# Patient Record
Sex: FEMALE | Race: WHITE | NOT HISPANIC OR LATINO | Employment: STUDENT | URBAN - METROPOLITAN AREA
[De-identification: names, ages, dates, MRNs, and addresses within clinical notes are randomized per-mention and may not be internally consistent; named-entity substitution may affect disease eponyms.]

---
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
## 2023-06-28 ENCOUNTER — HOSPITAL ENCOUNTER (OUTPATIENT)
Dept: PREADMISSION TESTING | Facility: HOSPITAL | Age: 23
Discharge: HOME | End: 2023-06-28
Attending: SURGERY
Payer: MEDICAID

## 2023-06-28 NOTE — DISCHARGE INSTRUCTIONS
[facility]  Pre Admit Instructions  Day and Date of Procedure:      Call your doctor if you become ill, have an infection, or are taking antibiotics before your surgery  Someone will call you between 10 a.m. and 5 p.m. the workday before the procedure to give you an arrival time       - If your time is before 7 a.m. Enter through Emergency Room door and check in with them       - 7 a.m. To 5 p.m. Enter through main entrance and check in with registration  You must have a responsible  to bring you home  Only one person will be allowed per patient due to Covid-19 restrictions  You must wear a mask at all times. If you do not have a mask, we will provide you with one. No Exception.   Cafeteria hours for guest: 7am to 10am; 11am to 1:30 pm.    Do NOT eat anything past:   Clear liquids until:  No dairy, creamers, gum or mints the morning of your procedure    Please    Do not wear makeup, jewelry, nail polish or body piercings  Bring containers/solution for contacts, dentures, bridges - these and hearing aids will be removed before your procedure  Do not bring cash, jewelry or valuables the day of your procedure   No smoking at least 24 hours before your procedure  Wear clothing that is comfortable and easy to take off and put on  Do NOT shave for at least 5 days before your surgery    PRE-OP Hibiclens bath Instructions:    Shower with Hibiclens the Night before and morning of the procedure.   Wash your face with your regular cleanser. DO NOT use Hibiclens on your face.  Thoroughly rinse your body with warm water from the neck down  Apply Hibiclens directly to your skin or on a wet washcloth and wash gently. Do not stand under the water while washing with Hibiclens as you will   remove the wash too soon.  Rinse thoroughly with warm water  DO NOT use your regular soap after you have bathed with the Hibiclens  Do not apply lotion or deodorant   Put on a clean pair of clothes after each  bath          Information about your stay (Please Review)    Before Surgery  Your doctor may order and review labs, x-rays, ECG or other tests as a pre-surgery workup and will call you if there is need for follow up.  No smoking inside or outside the hospital. You must leave the campus to smoke.  Wear clothing that is easy to take off and put on.  The hospital will provide you with a gown.  If your doctor orders a Fleets Enema or other prep, follow package and/or doctors orders.  Brush your teeth and rinse your mouth the morning of surgery, but dont swallow the water.  The nurse will ask questions and check your condition.  The doctor may tamela your surgical site.  Compression boots will be placed on your calves to reduce the risk of blood clots.  An IV will be started in pre-procedure area.  The doctor may order medicine to help you relax before surgery.    After Surgery  Pain medication may be ordered by the doctor after surgery.    When the nurse or doctor tells you it is okay to get out of bed, ask for help until you are stable.  The nurse may ask you to turn, cough, and deep breathe to prevent lung problems.  You can use a pillow to hold your incision when you deep breathe or cough to reduce pain.  Your blood pressure and oxygen will be monitored while in recovery  You cannot have anything to eat or drink while in surgical department  The nurse will give you discharge instructions: incision care, symptoms to report to your doctor, and your follow-up appointment when you are discharged.    You cannot drive yourself home.        Things you can do to  Reduce the Risk of Infections or Complications  Wash Hands and use Waterless Hand Sanitizers  Wash hands frequently with soap and warm water for at least 15 seconds.   Use hand sanitizers (alcohol based) often at home and in public if hands are not visibly soiled  Take Antibiotic Exactly as Prescribed  Do not stop antibiotics too soon; you risk developing infection  resistant to antibiotics  Take your antibiotic even if you are feeling better and even if they upset your stomach  Call the doctor if you cant tolerate the antibiotic or you have an allergic reaction  Stay Healthy  Take medicines as prescribed by your doctor  Keep your diabetes under control - diet and medication  Get enough rest, exercise and eat a healthy diet  Keep the Wound Clean and Dry  Wash hands before and after taking care of the incision (cut)  Wash hands when you remove a dressing, before you touch/apply a new dressing  Shower and clean incision with antibacterial soap and rinse well if the doctor approves  Allow the cut to dry completely before putting on a clean dressing  Do not touch the part of the bandage that will cover the incision  Do not use ointments unless your doctor tells you to-can promote bacterial growth  If ordered, put ointment directly on the dressing-do not touch the end of the tube  Do not scrub, remove scabs, or leave a damp dressing on the incision  Do not use peroxide or alcohol to clean the incision unless the doctor tells you to   Do not let children, pets or anyone else contaminate the incision  Stop Smoking To Prevent Infection  Stop smoking-Centers for Disease Control recommends 30 days before surgery  Smokers get more infections after surgery-studies have shown 6 times the risk  Smokers have more scarring and heal slower-open wounds get infected easier  Prevent Respiratory complications  Stop smoking  Turn, cough, and deep breathe even if you have some pain when you do so.  Splint your incision with a pillow when you cough/deep breath, to help control pain.  Do not lie in one position for long periods of time.   Prevent Blood Clots  When you wake move your legs, flex your feet, rotate your ankles, wiggle your toes  Get up when the doctor says its ok.  Dangle your feet from the side of the bed  Report symptoms-leg pain, redness/swelling, warm to touch; fever; shortness of  breath, chest pain, severe upper back pain. [facility]  Pre Admit Instructions  Day and Date of Procedure:      Call your doctor if you become ill, have an infection, or are taking antibiotics before your surgery  Someone will call you between 10 a.m. and 5 p.m. the workday before the procedure to give you an arrival time       - If your time is before 7 a.m. Enter through Emergency Room door and check in with them       - 7 a.m. To 5 p.m. Enter through main entrance and check in with registration  You must have a responsible  to bring you home  Only one person will be allowed per patient due to Covid-19 restrictions  You must wear a mask at all times. If you do not have a mask, we will provide you with one. No Exception.   Cafeteria hours for guest: 7am to 10am; 11am to 1:30 pm.    Do NOT eat anything past:   Clear liquids until:  No dairy, creamers, gum or mints the morning of your procedure    Please    Do not wear makeup, jewelry, nail polish or body piercings  Bring containers/solution for contacts, dentures, bridges - these and hearing aids will be removed before your procedure  Do not bring cash, jewelry or valuables the day of your procedure   No smoking at least 24 hours before your procedure  Wear clothing that is comfortable and easy to take off and put on  Do NOT shave for at least 5 days before your surgery    PRE-OP Hibiclens bath Instructions:    Shower with Hibiclens the Night before and morning of the procedure.   Wash your face with your regular cleanser. DO NOT use Hibiclens on your face.  Thoroughly rinse your body with warm water from the neck down  Apply Hibiclens directly to your skin or on a wet washcloth and wash gently. Do not stand under the water while washing with Hibiclens as you will   remove the wash too soon.  Rinse thoroughly with warm water  DO NOT use your regular soap after you have bathed with the Hibiclens  Do not apply lotion or deodorant   Put on a clean  pair of clothes after each bath          Information about your stay (Please Review)    Before Surgery  Your doctor may order and review labs, x-rays, ECG or other tests as a pre-surgery workup and will call you if there is need for follow up.  No smoking inside or outside the hospital. You must leave the campus to smoke.  Wear clothing that is easy to take off and put on.  The hospital will provide you with a gown.  If your doctor orders a Fleets Enema or other prep, follow package and/or doctors orders.  Brush your teeth and rinse your mouth the morning of surgery, but dont swallow the water.  The nurse will ask questions and check your condition.  The doctor may tamela your surgical site.  Compression boots will be placed on your calves to reduce the risk of blood clots.  An IV will be started in pre-procedure area.  The doctor may order medicine to help you relax before surgery.    After Surgery  Pain medication may be ordered by the doctor after surgery.    When the nurse or doctor tells you it is okay to get out of bed, ask for help until you are stable.  The nurse may ask you to turn, cough, and deep breathe to prevent lung problems.  You can use a pillow to hold your incision when you deep breathe or cough to reduce pain.  Your blood pressure and oxygen will be monitored while in recovery  You cannot have anything to eat or drink while in surgical department  The nurse will give you discharge instructions: incision care, symptoms to report to your doctor, and your follow-up appointment when you are discharged.    You cannot drive yourself home.        Things you can do to  Reduce the Risk of Infections or Complications  Wash Hands and use Waterless Hand Sanitizers  Wash hands frequently with soap and warm water for at least 15 seconds.   Use hand sanitizers (alcohol based) often at home and in public if hands are not visibly soiled  Take Antibiotic Exactly as Prescribed  Do not stop antibiotics too soon; you  risk developing infection resistant to antibiotics  Take your antibiotic even if you are feeling better and even if they upset your stomach  Call the doctor if you cant tolerate the antibiotic or you have an allergic reaction  Stay Healthy  Take medicines as prescribed by your doctor  Keep your diabetes under control - diet and medication  Get enough rest, exercise and eat a healthy diet  Keep the Wound Clean and Dry  Wash hands before and after taking care of the incision (cut)  Wash hands when you remove a dressing, before you touch/apply a new dressing  Shower and clean incision with antibacterial soap and rinse well if the doctor approves  Allow the cut to dry completely before putting on a clean dressing  Do not touch the part of the bandage that will cover the incision  Do not use ointments unless your doctor tells you to-can promote bacterial growth  If ordered, put ointment directly on the dressing-do not touch the end of the tube  Do not scrub, remove scabs, or leave a damp dressing on the incision  Do not use peroxide or alcohol to clean the incision unless the doctor tells you to   Do not let children, pets or anyone else contaminate the incision  Stop Smoking To Prevent Infection  Stop smoking-Centers for Disease Control recommends 30 days before surgery  Smokers get more infections after surgery-studies have shown 6 times the risk  Smokers have more scarring and heal slower-open wounds get infected easier  Prevent Respiratory complications  Stop smoking  Turn, cough, and deep breathe even if you have some pain when you do so.  Splint your incision with a pillow when you cough/deep breath, to help control pain.  Do not lie in one position for long periods of time.   Prevent Blood Clots  When you wake move your legs, flex your feet, rotate your ankles, wiggle your toes  Get up when the doctor says its ok.  Dangle your feet from the side of the bed  Report symptoms-leg pain, redness/swelling, warm to  touch; fever; shortness of breath, chest pain, severe upper back pain. [facility]  Pre Admit Instructions  Day and Date of Procedure:      Call your doctor if you become ill, have an infection, or are taking antibiotics before your surgery  Someone will call you between 10 a.m. and 5 p.m. the workday before the procedure to give you an arrival time       - If your time is before 7 a.m. Enter through Emergency Room door and check in with them       - 7 a.m. To 5 p.m. Enter through main entrance and check in with registration  You must have a responsible  to bring you home  Only one person will be allowed per patient due to Covid-19 restrictions  You must wear a mask at all times. If you do not have a mask, we will provide you with one. No Exception.   Cafeteria hours for guest: 7am to 10am; 11am to 1:30 pm.    Do NOT eat anything past:   Clear liquids until:  No dairy, creamers, gum or mints the morning of your procedure    Please    Do not wear makeup, jewelry, nail polish or body piercings  Bring containers/solution for contacts, dentures, bridges - these and hearing aids will be removed before your procedure  Do not bring cash, jewelry or valuables the day of your procedure   No smoking at least 24 hours before your procedure  Wear clothing that is comfortable and easy to take off and put on  Do NOT shave for at least 5 days before your surgery    PRE-OP Hibiclens bath Instructions:    Shower with Hibiclens the Night before and morning of the procedure.   Wash your face with your regular cleanser. DO NOT use Hibiclens on your face.  Thoroughly rinse your body with warm water from the neck down  Apply Hibiclens directly to your skin or on a wet washcloth and wash gently. Do not stand under the water while washing with Hibiclens as you will   remove the wash too soon.  Rinse thoroughly with warm water  DO NOT use your regular soap after you have bathed with the Hibiclens  Do not apply lotion or  deodorant   Put on a clean pair of clothes after each bath          Information about your stay (Please Review)    Before Surgery  Your doctor may order and review labs, x-rays, ECG or other tests as a pre-surgery workup and will call you if there is need for follow up.  No smoking inside or outside the hospital. You must leave the campus to smoke.  Wear clothing that is easy to take off and put on.  The hospital will provide you with a gown.  If your doctor orders a Fleets Enema or other prep, follow package and/or doctors orders.  Brush your teeth and rinse your mouth the morning of surgery, but dont swallow the water.  The nurse will ask questions and check your condition.  The doctor may tamela your surgical site.  Compression boots will be placed on your calves to reduce the risk of blood clots.  An IV will be started in pre-procedure area.  The doctor may order medicine to help you relax before surgery.    After Surgery  Pain medication may be ordered by the doctor after surgery.    When the nurse or doctor tells you it is okay to get out of bed, ask for help until you are stable.  The nurse may ask you to turn, cough, and deep breathe to prevent lung problems.  You can use a pillow to hold your incision when you deep breathe or cough to reduce pain.  Your blood pressure and oxygen will be monitored while in recovery  You cannot have anything to eat or drink while in surgical department  The nurse will give you discharge instructions: incision care, symptoms to report to your doctor, and your follow-up appointment when you are discharged.    You cannot drive yourself home.        Things you can do to  Reduce the Risk of Infections or Complications  Wash Hands and use Waterless Hand Sanitizers  Wash hands frequently with soap and warm water for at least 15 seconds.   Use hand sanitizers (alcohol based) often at home and in public if hands are not visibly soiled  Take Antibiotic Exactly as Prescribed  Do not  stop antibiotics too soon; you risk developing infection resistant to antibiotics  Take your antibiotic even if you are feeling better and even if they upset your stomach  Call the doctor if you cant tolerate the antibiotic or you have an allergic reaction  Stay Healthy  Take medicines as prescribed by your doctor  Keep your diabetes under control - diet and medication  Get enough rest, exercise and eat a healthy diet  Keep the Wound Clean and Dry  Wash hands before and after taking care of the incision (cut)  Wash hands when you remove a dressing, before you touch/apply a new dressing  Shower and clean incision with antibacterial soap and rinse well if the doctor approves  Allow the cut to dry completely before putting on a clean dressing  Do not touch the part of the bandage that will cover the incision  Do not use ointments unless your doctor tells you to-can promote bacterial growth  If ordered, put ointment directly on the dressing-do not touch the end of the tube  Do not scrub, remove scabs, or leave a damp dressing on the incision  Do not use peroxide or alcohol to clean the incision unless the doctor tells you to   Do not let children, pets or anyone else contaminate the incision  Stop Smoking To Prevent Infection  Stop smoking-Centers for Disease Control recommends 30 days before surgery  Smokers get more infections after surgery-studies have shown 6 times the risk  Smokers have more scarring and heal slower-open wounds get infected easier  Prevent Respiratory complications  Stop smoking  Turn, cough, and deep breathe even if you have some pain when you do so.  Splint your incision with a pillow when you cough/deep breath, to help control pain.  Do not lie in one position for long periods of time.   Prevent Blood Clots  When you wake move your legs, flex your feet, rotate your ankles, wiggle your toes  Get up when the doctor says its ok.  Dangle your feet from the side of the bed  Report symptoms-leg  pain, redness/swelling, warm to touch; fever; shortness of breath, chest pain, severe upper back pain.

## 2023-07-04 ENCOUNTER — ANESTHESIA EVENT (OUTPATIENT)
Dept: SURGERY | Facility: HOSPITAL | Age: 23
End: 2023-07-04
Payer: MEDICAID

## 2023-07-04 NOTE — ANESTHESIA PREPROCEDURE EVALUATION
07/04/2023  Teddy Thompson is a 22 y.o., female.      Pre-op Assessment    I have reviewed the Patient Summary Reports.     I have reviewed the Nursing Notes. I have reviewed the NPO Status.   I have reviewed the Medications.     Review of Systems  Anesthesia Hx:  No problems with previous Anesthesia    Social:  Non-Smoker, No Alcohol Use    Hematology/Oncology:  Hematology Normal   Oncology Normal     EENT/Dental:EENT/Dental Normal   Cardiovascular:  Cardiovascular Normal Exercise tolerance: good     Pulmonary:  Pulmonary Normal    Renal/:  Renal/ Normal     Hepatic/GI:  Hepatic/GI Normal    Musculoskeletal:  Musculoskeletal Normal    Neurological:   Headaches    Endocrine:  Endocrine Normal    Dermatological:  Skin Normal    Psych:  Psychiatric Normal           Physical Exam  General: Well nourished    Airway:  Mallampati: II   Mouth Opening: Normal  TM Distance: Normal  Tongue: Normal  Neck ROM: Normal ROM    Chest/Lungs:  Clear to auscultation    Heart:  Rate: Normal  Rhythm: Regular Rhythm  Sounds: Normal        Anesthesia Plan  Type of Anesthesia, risks & benefits discussed:    Anesthesia Type: Gen Supraglottic Airway, Gen ETT, MAC  Intra-op Monitoring Plan: Standard ASA Monitors  Post Op Pain Control Plan: multimodal analgesia  Induction:  IV  Airway Plan: Direct  Informed Consent: Informed consent signed with the Patient and all parties understand the risks and agree with anesthesia plan.  All questions answered.   ASA Score: 1    Ready For Surgery From Anesthesia Perspective.     .

## 2023-07-05 ENCOUNTER — HOSPITAL ENCOUNTER (OUTPATIENT)
Facility: HOSPITAL | Age: 23
Discharge: HOME | End: 2023-07-05
Attending: SURGERY | Admitting: SURGERY
Payer: MEDICAID

## 2023-07-05 ENCOUNTER — ANESTHESIA (OUTPATIENT)
Dept: SURGERY | Facility: HOSPITAL | Age: 23
End: 2023-07-05
Payer: MEDICAID

## 2023-07-05 VITALS
DIASTOLIC BLOOD PRESSURE: 64 MMHG | RESPIRATION RATE: 16 BRPM | HEART RATE: 66 BPM | TEMPERATURE: 98 F | SYSTOLIC BLOOD PRESSURE: 110 MMHG | OXYGEN SATURATION: 100 %

## 2023-07-05 DIAGNOSIS — R59.0 LYMPHADENOPATHY, CERVICAL: Primary | ICD-10-CM

## 2023-07-05 PROCEDURE — 88342 CHG IMMUNOCYTOCHEMISTRY: ICD-10-PCS | Mod: 26,59,, | Performed by: PATHOLOGY

## 2023-07-05 PROCEDURE — D9220AH HC ANESTHESIA PROFESSIONAL FEE: Mod: QZ | Performed by: NURSE ANESTHETIST, CERTIFIED REGISTERED

## 2023-07-05 PROCEDURE — 36000706: Performed by: SURGERY

## 2023-07-05 PROCEDURE — 88342 IMHCHEM/IMCYTCHM 1ST ANTB: CPT | Mod: 59 | Performed by: PATHOLOGY

## 2023-07-05 PROCEDURE — 88305 TISSUE EXAM BY PATHOLOGIST: ICD-10-PCS | Mod: 26,,, | Performed by: PATHOLOGY

## 2023-07-05 PROCEDURE — 88189 FLOWCYTOMETRY/READ 16 & >: CPT | Mod: ,,, | Performed by: PATHOLOGY

## 2023-07-05 PROCEDURE — 63600175 PHARM REV CODE 636 W HCPCS: Performed by: NURSE ANESTHETIST, CERTIFIED REGISTERED

## 2023-07-05 PROCEDURE — 36000707: Performed by: SURGERY

## 2023-07-05 PROCEDURE — 37000009 HC ANESTHESIA EA ADD 15 MINS: Performed by: SURGERY

## 2023-07-05 PROCEDURE — 25000003 PHARM REV CODE 250: Performed by: SURGERY

## 2023-07-05 PROCEDURE — 88342 IMHCHEM/IMCYTCHM 1ST ANTB: CPT | Mod: 26,59,, | Performed by: PATHOLOGY

## 2023-07-05 PROCEDURE — 63600175 PHARM REV CODE 636 W HCPCS: Performed by: SURGERY

## 2023-07-05 PROCEDURE — 00320 ANES ALL PX NECK NOS 1YR/>: CPT | Performed by: SURGERY

## 2023-07-05 PROCEDURE — 88189 PR  FLOWCYTOMETRY/READ, 16 & > MARKERS: ICD-10-PCS | Mod: ,,, | Performed by: PATHOLOGY

## 2023-07-05 PROCEDURE — 71000033 HC RECOVERY, INTIAL HOUR: Performed by: SURGERY

## 2023-07-05 PROCEDURE — 88341 IMHCHEM/IMCYTCHM EA ADD ANTB: CPT | Performed by: PATHOLOGY

## 2023-07-05 PROCEDURE — 88341 IMHCHEM/IMCYTCHM EA ADD ANTB: CPT | Mod: 26,59,, | Performed by: PATHOLOGY

## 2023-07-05 PROCEDURE — 37000008 HC ANESTHESIA 1ST 15 MINUTES: Performed by: SURGERY

## 2023-07-05 PROCEDURE — 88365 INSITU HYBRIDIZATION (FISH): CPT | Performed by: PATHOLOGY

## 2023-07-05 PROCEDURE — 88185 FLOWCYTOMETRY/TC ADD-ON: CPT | Performed by: PATHOLOGY

## 2023-07-05 PROCEDURE — 88305 TISSUE EXAM BY PATHOLOGIST: CPT | Mod: 26,,, | Performed by: PATHOLOGY

## 2023-07-05 PROCEDURE — 88184 FLOWCYTOMETRY/ TC 1 MARKER: CPT | Performed by: PATHOLOGY

## 2023-07-05 PROCEDURE — 88365 INSITU HYBRIDIZATION (FISH): CPT | Mod: 26,,, | Performed by: PATHOLOGY

## 2023-07-05 PROCEDURE — 88365 PR  TISSUE HYBRIDIZATION: ICD-10-PCS | Mod: 26,,, | Performed by: PATHOLOGY

## 2023-07-05 PROCEDURE — 25000003 PHARM REV CODE 250: Performed by: NURSE ANESTHETIST, CERTIFIED REGISTERED

## 2023-07-05 PROCEDURE — 00320 ANES ALL PX NECK NOS 1YR/>: CPT | Mod: QZ | Performed by: NURSE ANESTHETIST, CERTIFIED REGISTERED

## 2023-07-05 PROCEDURE — 88341 PR IHC OR ICC EACH ADD'L SINGLE ANTIBODY  STAINPR: ICD-10-PCS | Mod: 26,59,, | Performed by: PATHOLOGY

## 2023-07-05 RX ORDER — PROPOFOL 10 MG/ML
VIAL (ML) INTRAVENOUS
Status: DISCONTINUED | OUTPATIENT
Start: 2023-07-05 | End: 2023-07-05

## 2023-07-05 RX ORDER — SODIUM CHLORIDE 9 MG/ML
INJECTION, SOLUTION INTRAVENOUS CONTINUOUS
Status: CANCELLED | OUTPATIENT
Start: 2023-07-05

## 2023-07-05 RX ORDER — HYDROCODONE BITARTRATE AND ACETAMINOPHEN 10; 325 MG/1; MG/1
1 TABLET ORAL EVERY 6 HOURS PRN
Qty: 10 TABLET | Refills: 0 | Status: SHIPPED | OUTPATIENT
Start: 2023-07-05

## 2023-07-05 RX ORDER — BUPIVACAINE HYDROCHLORIDE AND EPINEPHRINE 5; 5 MG/ML; UG/ML
INJECTION, SOLUTION EPIDURAL; INTRACAUDAL; PERINEURAL
Status: DISCONTINUED | OUTPATIENT
Start: 2023-07-05 | End: 2023-07-05 | Stop reason: HOSPADM

## 2023-07-05 RX ORDER — ACETAMINOPHEN 10 MG/ML
INJECTION, SOLUTION INTRAVENOUS
Status: DISCONTINUED | OUTPATIENT
Start: 2023-07-05 | End: 2023-07-05

## 2023-07-05 RX ORDER — FENTANYL CITRATE 50 UG/ML
INJECTION, SOLUTION INTRAMUSCULAR; INTRAVENOUS
Status: DISCONTINUED | OUTPATIENT
Start: 2023-07-05 | End: 2023-07-05

## 2023-07-05 RX ORDER — MIDAZOLAM HYDROCHLORIDE 1 MG/ML
INJECTION, SOLUTION INTRAMUSCULAR; INTRAVENOUS
Status: DISCONTINUED | OUTPATIENT
Start: 2023-07-05 | End: 2023-07-05

## 2023-07-05 RX ORDER — LIDOCAINE HYDROCHLORIDE 20 MG/ML
INJECTION, SOLUTION EPIDURAL; INFILTRATION; INTRACAUDAL; PERINEURAL
Status: DISCONTINUED | OUTPATIENT
Start: 2023-07-05 | End: 2023-07-05

## 2023-07-05 RX ORDER — IBUPROFEN 600 MG/1
600 TABLET ORAL EVERY 6 HOURS PRN
Status: CANCELLED | OUTPATIENT
Start: 2023-07-05

## 2023-07-05 RX ORDER — KETOROLAC TROMETHAMINE 30 MG/ML
INJECTION, SOLUTION INTRAMUSCULAR; INTRAVENOUS
Status: DISCONTINUED | OUTPATIENT
Start: 2023-07-05 | End: 2023-07-05

## 2023-07-05 RX ORDER — KETAMINE HCL IN 0.9 % NACL 50 MG/5 ML
SYRINGE (ML) INTRAVENOUS
Status: DISCONTINUED | OUTPATIENT
Start: 2023-07-05 | End: 2023-07-05

## 2023-07-05 RX ORDER — HYDROCODONE BITARTRATE AND ACETAMINOPHEN 5; 325 MG/1; MG/1
1 TABLET ORAL EVERY 4 HOURS PRN
Status: CANCELLED | OUTPATIENT
Start: 2023-07-05

## 2023-07-05 RX ORDER — ONDANSETRON 2 MG/ML
4 INJECTION INTRAMUSCULAR; INTRAVENOUS EVERY 12 HOURS PRN
Status: CANCELLED | OUTPATIENT
Start: 2023-07-05

## 2023-07-05 RX ORDER — ONDANSETRON 2 MG/ML
INJECTION INTRAMUSCULAR; INTRAVENOUS
Status: DISCONTINUED | OUTPATIENT
Start: 2023-07-05 | End: 2023-07-05

## 2023-07-05 RX ADMIN — FENTANYL CITRATE 50 MCG: 50 INJECTION, SOLUTION INTRAMUSCULAR; INTRAVENOUS at 10:07

## 2023-07-05 RX ADMIN — PROPOFOL 100 MG: 10 INJECTION, EMULSION INTRAVENOUS at 10:07

## 2023-07-05 RX ADMIN — PROPOFOL 50 MG: 10 INJECTION, EMULSION INTRAVENOUS at 11:07

## 2023-07-05 RX ADMIN — CEFAZOLIN 2 G: 2 INJECTION, POWDER, FOR SOLUTION INTRAMUSCULAR; INTRAVENOUS at 11:07

## 2023-07-05 RX ADMIN — ACETAMINOPHEN 1000 MG: 10 INJECTION, SOLUTION INTRAVENOUS at 11:07

## 2023-07-05 RX ADMIN — Medication 30 MG: at 11:07

## 2023-07-05 RX ADMIN — MIDAZOLAM 1 MG: 1 INJECTION INTRAMUSCULAR; INTRAVENOUS at 11:07

## 2023-07-05 RX ADMIN — FENTANYL CITRATE 50 MCG: 50 INJECTION, SOLUTION INTRAMUSCULAR; INTRAVENOUS at 11:07

## 2023-07-05 RX ADMIN — SODIUM CHLORIDE, SODIUM LACTATE, POTASSIUM CHLORIDE, AND CALCIUM CHLORIDE: .6; .31; .03; .02 INJECTION, SOLUTION INTRAVENOUS at 10:07

## 2023-07-05 RX ADMIN — ONDANSETRON 8 MG: 2 INJECTION, SOLUTION INTRAMUSCULAR; INTRAVENOUS at 11:07

## 2023-07-05 RX ADMIN — MIDAZOLAM 3 MG: 1 INJECTION INTRAMUSCULAR; INTRAVENOUS at 10:07

## 2023-07-05 RX ADMIN — Medication 20 MG: at 11:07

## 2023-07-05 RX ADMIN — KETOROLAC TROMETHAMINE 30 MG: 30 INJECTION, SOLUTION INTRAMUSCULAR at 11:07

## 2023-07-05 RX ADMIN — LIDOCAINE HYDROCHLORIDE 80 MG: 20 INJECTION, SOLUTION EPIDURAL; INFILTRATION; INTRACAUDAL; PERINEURAL at 10:07

## 2023-07-05 NOTE — TRANSFER OF CARE
Anesthesia Transfer of Care Note    Patient: Teddy Thompson    Procedure(s) Performed: Procedure(s) (LRB):  BIOPSY, LYMPH NODE, CERVICAL (Left)    Patient location: PACU    Anesthesia Type: general    Transport from OR: Transported from OR on 6-10 L/min O2 by face mask with adequate spontaneous ventilation    Post pain: adequate analgesia    Post assessment: no apparent anesthetic complications and tolerated procedure well    Post vital signs: stable    Level of consciousness: sedated    Nausea/Vomiting: no nausea/vomiting    Complications: none    Transfer of care protocol was followed      Last vitals:   Visit Vitals  BP 99/69   Pulse 70   Temp 36.3 °C (97.3 °F) (Temporal)   Resp 16   SpO2 99%   Breastfeeding No

## 2023-07-05 NOTE — BRIEF OP NOTE
[facility] - Surgery  Brief Operative Note    Surgery Date: 7/5/2023     Surgeon(s) and Role:     * Noe Robles MD - Primary    Assisting Surgeon: None    Pre-op Diagnosis:  Lymphadenopathy [R59.1]    Post-op Diagnosis:  Post-Op Diagnosis Codes:     * Lymphadenopathy [R59.1]    Procedure(s) (LRB):  BIOPSY, LYMPH NODE, CERVICAL (Left)    Anesthesia: Local MAC    Operative Findings: enlarged deep left cervical node    Estimated Blood Loss: * No values recorded between 7/5/2023 11:09 AM and 7/5/2023 11:49 AM *         Specimens:   Specimen (24h ago, onward)       Start     Ordered    07/05/23 1137  Specimen to Pathology, Surgery General Surgery  Once        Comments: Pre-op Diagnosis: Lymphadenopathy [R59.1]Procedure(s):BIOPSY, LYMPH NODE, CERVICAL Number of specimens: 2Name of specimens: deep cervical node biopsyDrCaroline Robles     References:    Click here for ordering Quick Tip   Question Answer Comment   Procedure Type: General Surgery    Which provider would you like to cc? NOE ROBLES    Release to patient Immediate        07/05/23 1139                      Discharge Note    OUTCOME: Patient tolerated treatment/procedure well without complication and is now ready for discharge.    DISPOSITION: Home or Self Care    FINAL DIAGNOSIS:  <principal problem not specified>    FOLLOWUP: In clinic    DISCHARGE INSTRUCTIONS:    Discharge Procedure Orders   Diet general     Ice to affected area     Call MD for:  temperature >100.4     Call MD for:  persistent nausea and vomiting     Call MD for:  severe uncontrolled pain     Call MD for:  difficulty breathing, headache or visual disturbances     No dressing needed

## 2023-07-05 NOTE — OP NOTE
DATE OF PROCEDURE: 7/5/2023     PREOPERATIVE DIAGNOSES:   Lymphadenopathy [R59.1]    POSTOPERATIVE DIAGNOSES:   Lymphadenopathy [R59.1]    PROCEDURES PERFORMED:   Procedure(s) (LRB):  BIOPSY, LYMPH NODE, CERVICAL (Left)    Surgeon(s) and Role:     * Noe Ames MD - Primary     ANESTHESIA: General.     BLOOD LOSS: Minimal.     SPECIMENS: left cervical node     FINDINGS:  After consent was obtained she was taken to the operating room and placed on some IV sedation.  Her left neck was prepped and draped in the usual fashion.  Ultrasound was used to identify the adenopathy which was in the deep cervical chain.  I anesthetized a 3 cm area and made a longitudinal cut along the inferior border of the sternocleidomastoid muscle.  I dissected down into the deep space of the neck or I opened the carotid sheath and was able to visualize the left internal jugular vein.  I dissected lateral to this identifying the node.  I then bluntly teased the note out and removed it Holter.  Irrigated the site nose no evidence of any bleeding.  I then closed the platysma and anterior sternocleidomastoid fascia with 2-0 Vicryl sutures.  Skin was closed with 4-0 Monocryl as running subcuticular fashion.  Dermabond as a dressing.  She tolerated procedure well      At the end of the case sponge, instrument, and needle counts were correct.

## 2023-07-05 NOTE — ANESTHESIA POSTPROCEDURE EVALUATION
Anesthesia Post Evaluation    Patient: Teddy Thompson    Procedure(s) Performed: Procedure(s) (LRB):  BIOPSY, LYMPH NODE, CERVICAL (Left)    Final Anesthesia Type: general      Patient location during evaluation: PACU  Patient participation: Yes- Able to Participate  Level of consciousness: awake and alert and oriented  Post-procedure vital signs: reviewed and stable  Pain management: adequate  Airway patency: patent    PONV status at discharge: No PONV  Anesthetic complications: no      Cardiovascular status: blood pressure returned to baseline and hemodynamically stable  Respiratory status: unassisted, spontaneous ventilation and room air  Hydration status: euvolemic  Follow-up not needed.          Vitals Value Taken Time   /64 07/05/23 1210   Temp 36.7 °C (98 °F) 07/05/23 1153   Pulse 66 07/05/23 1210   Resp 16 07/05/23 1210   SpO2 100 % 07/05/23 1210         Event Time   Out of Recovery 12:30:00         Pain/Santos Score: Santos Score: 10 (7/5/2023 11:54 AM)

## 2023-07-06 LAB
FLOW CYTOMETRY ANTIBODIES ANALYZED - LYMPH NODE: NORMAL
FLOW CYTOMETRY COMMENT - LYMPH NODE: NORMAL
FLOW CYTOMETRY INTERPRETATION - LYMPH NODE: NORMAL

## 2023-07-11 LAB
COMMENT: NORMAL
FINAL PATHOLOGIC DIAGNOSIS: NORMAL
GROSS: NORMAL
Lab: NORMAL
MICROSCOPIC EXAM: NORMAL

## 2024-03-12 ENCOUNTER — PATIENT MESSAGE (OUTPATIENT)
Dept: ORTHOPEDICS | Facility: CLINIC | Age: 24
End: 2024-03-12

## 2024-03-12 ENCOUNTER — OFFICE VISIT (OUTPATIENT)
Dept: ORTHOPEDICS | Facility: CLINIC | Age: 24
End: 2024-03-12
Payer: MEDICAID

## 2024-03-12 DIAGNOSIS — M25.511 ACUTE PAIN OF RIGHT SHOULDER: ICD-10-CM

## 2024-03-12 DIAGNOSIS — S43.431A SUPERIOR GLENOID LABRUM LESION OF RIGHT SHOULDER, INITIAL ENCOUNTER: ICD-10-CM

## 2024-03-12 DIAGNOSIS — M25.311 INSTABILITY OF RIGHT SHOULDER JOINT: Primary | ICD-10-CM

## 2024-03-12 PROCEDURE — 1160F RVW MEDS BY RX/DR IN RCRD: CPT | Mod: CPTII,,,

## 2024-03-12 PROCEDURE — 99999 PR PBB SHADOW E&M-EST. PATIENT-LVL III: CPT | Mod: PBBFAC,,,

## 2024-03-12 PROCEDURE — 99213 OFFICE O/P EST LOW 20 MIN: CPT | Mod: PBBFAC,PN

## 2024-03-12 PROCEDURE — 1159F MED LIST DOCD IN RCRD: CPT | Mod: CPTII,,,

## 2024-03-12 PROCEDURE — 99204 OFFICE O/P NEW MOD 45 MIN: CPT | Mod: S$PBB,,,

## 2024-03-12 RX ORDER — DROSPIRENONE 4 MG/1
1 TABLET, FILM COATED ORAL
COMMUNITY
Start: 2024-02-21

## 2024-03-12 NOTE — PROGRESS NOTES
"Subjective:      Patient ID: Teddy Thompson is a 23 y.o. female.    Chief Complaint: Injury of the Right Shoulder      HPI: Teddy Thompson is a 23 y.o. right hand dominant female who presents to clinic for intermittent right shoulder pain. Patient reports injury, states on 07/19/2023, she was intoxicated and fell with her arm out and essentially landed on the dresser "with her armpit."  Patient reports immediate pain following the fall. She reports the pain subsided after the next few days. She states she would have occasional pain when sleeping on the right shoulder but it was manageable. She reports increased pain and limited ROM after playing [employer] one night. Following this, she went for evaluation and treatment. An MRI was performed which confirmed labral tearing. Patient presents today to discuss treatment options.     She reports the pain is located over (points to) anterior and posterior right shoulder.  She reports that the pain is a 0/10  pain today. Pain is 6/10 dull, aching pain at its worst.  She also reports the shoulder feels very unstable and feels as though her shoulder is going to dislocate with minimal force. Associated symptoms include a  "crunching" feeling and popping sensation with certain movements. The pain is aggravated by sleeping on right shoulder, repetitive motion, overhead lifting, and throwing. Denies upper arm paresthesias, pain radiating to arm/hand and inability to bear weight. The pain is affecting ADLs and limiting desired level of activity. There is not a history of previous surgery to the shoulder.  Previous treatments include HEP, NSAIDs, rest, and activity modification which have provided poor relief.       Occupation: Nursing student and ER tech.     Ambulating: unassisted  Diabetic:  No  Smoking:  She has never smoked.  History of DVT/PE: Negative    PAST MEDICAL HISTORY:    Past Medical History:   Diagnosis Date    Migraines      PAST SURGICAL HISTORY:  "   Past Surgical History:   Procedure Laterality Date    ANKLE SURGERY Right 2021    BIOPSY OF CERVICAL LYMPH NODE Left 7/5/2023    Procedure: BIOPSY, LYMPH NODE, CERVICAL;  Surgeon: Noe Ames MD;  Location: [facility];  Service: General;  Laterality: Left;    COLONOSCOPY  2021    TONSILLECTOMY       FAMILY HISTORY:    History reviewed. No pertinent family history.    SOCIAL HISTORY:    Social History     Occupational History    Not on file   Tobacco Use    Smoking status: Never    Smokeless tobacco: Never   Substance and Sexual Activity    Alcohol use: Yes     Comment: occasional    Drug use: Never    Sexual activity: Yes     Partners: Male     Birth control/protection: None     MEDICATIONS:   Current Outpatient Medications:     SLYND 4 mg (28) Tab, Take 1 tablet by mouth., Disp: , Rfl:     dicyclomine (BENTYL) 20 mg tablet, Take 1 tablet (20 mg total) by mouth 4 (four) times daily as needed (abdominal pain)., Disp: 20 tablet, Rfl: 0    doxycycline (VIBRAMYCIN) 100 MG Cap, Take 1 capsule (100 mg total) by mouth once daily. Take with food., Disp: 30 capsule, Rfl: 2    HYDROcodone-acetaminophen (NORCO)  mg per tablet, Take 1 tablet by mouth every 6 (six) hours as needed for Pain., Disp: 10 tablet, Rfl: 0    ALLERGIES:   Review of patient's allergies indicates:   Allergen Reactions    Cat hair std allergenic ext Itching     Itchy eyes    Topiramate Hives       Review of Systems:  Constitution: Negative for chills, fever and night sweats.   HENT: Negative for congestion and headaches.    Eyes: Negative for blurred vision or vision loss.  Cardiovascular: Negative for chest pain and syncope.   Respiratory: Negative for cough and shortness of breath.    Endocrine: Negative for polydipsia, polyphagia and polyuria.   Hematologic/Lymphatic: Negative for bleeding problem. Does not bruise/bleed easily.   Skin: Negative for dry skin, itching and rash.   Musculoskeletal: See HPI.   Gastrointestinal: Negative for  abdominal pain and bowel incontinence.   Genitourinary: Negative for bladder incontinence and nocturia.   Neurological: Negative for disturbances in coordination, loss of balance and seizures.   Psychiatric/Behavioral: Negative for depression. The patient does not have insomnia.    Allergic/Immunologic: Negative for hives and persistent infections.          Objective:      There were no vitals filed for this visit.    PHYSICAL EXAM:  General: Alert & oriented x3, well-developed and well-nourished, in no acute distress, sitting comfortably in the exam room.  Skin: Warm and dry. Capillary refill less than 2 seconds.   Head: Normocephalic and atraumatic.   Eyes: Sclera appear normal.   Nose: No deformities seen.   Ears: No deformities seen.   Neck: No tracheal deviation present.   Pulmonary/Chest: Breathing unlabored.   Neurological: Alert and oriented to person, place, and time.   Psychiatric: Mood is pleasant and affect appropriate.     RIGHT SHOULDER        Skin: No evidence of swelling, redness, scars or visible deformity/atrophy.         Palpation:    No tenderness at the SC or AC joint  No tenderness over the clavicle   Tenderness to palpation over biceps tendon or bicipital groove  No tenderness over subacromial space        ROM:   AROM and PROM full to Forward Flexion, External Rotation, Internal Rotation, and Abduction        Strength Testing:  Forward Flexion  5/5  Abduction   5/5  Internal Rotation  5/5  External Rotation  5/5        Special Tests:  Empty can test  Negative  Full can test   Negative  Resisted internal rotation Negative  Resisted external rotation Negative   Neer's test   Positive  Tse'-Gerardo test Negative  Drawer test   Positive  Load and Shift test  Positive  Aprehension Test   Positive        Neurovascular Exam Bilateral UEs:  Sensation intact to light touch in the distal median, radial, and ulnar nerve distributions bilaterally.  Capillary refill intact <2 seconds in all digits  bilaterally    Imaging:          MRI Right Shoulder (dated: 02/07/2024:  Near circumferential labral tearing with sparing of the anterior inferior labrum.  There is tearing of the superior, posterior, and posteroinferior labrum.  Minimal cartilaginous fissuring is noted along the posteroinferior paralabral aspect of the glenoid associated with the tearing.  There is no significant rotator cuff pathology with only minimal tendinosis of the supraspinatus tendon and minimal subdeltoid bursal inflammation.        Assessment:       1. Instability of right shoulder joint    2. Acute pain of right shoulder    3. Superior glenoid labrum lesion of right shoulder, initial encounter        Plan:       Orders Placed This Encounter    X-Ray Shoulder Trauma 3 view Right    Ambulatory referral/consult to Physical/Occupational Therapy     I explained the nature of the problem to the patient.     I discussed at length with the patient all the different treatment options available for her right shoulder including anti-inflammatories, acetaminophen, rest, ice/heat, and physical therapy. I explained the potential role of surgery in the treatment of this condition. The patient understands that if non-surgical measures do not adequately control symptoms, surgery will be considered in the future.     I personally discussed this case and imaging report with supervising physician, Dr. Bailey. Dr. Bailey recommends right shoulder x-rays prior to next visit and formal physical therapy x3 months prior to surgical intervention.     Medications:  OTC Tylenol and NSAIDs as needed.  Physical Therapy:  Ambulatory referral to physical therapy for right shoulder.  Pain Management: Ice compress to the affected area 2-3x a day for 15-20 minutes as needed for pain management.  Orders:  Right shoulder x-rays prior to next visit. Order placed today.     Follow-Up:  3 months with Dr. Bailey for follow-up and to discuss further treatment options.     All  of the patient's questions were answered and the patient will contact us if they have any questions or concerns in the interim.    Ruth Ann Pierce PA-C  [facility]  Orthopedic Surgery    Medical Dictation software was used during the dictation of portions or the entirety of this medical record.  Phonetic or grammatic errors may exist due to the use of this software. For clarification, refer to the author of the document.

## 2024-03-13 DIAGNOSIS — M25.311 INSTABILITY OF RIGHT SHOULDER JOINT: Primary | ICD-10-CM

## 2024-03-13 DIAGNOSIS — M25.511 ACUTE PAIN OF RIGHT SHOULDER: ICD-10-CM

## 2024-03-13 DIAGNOSIS — S43.431A SUPERIOR GLENOID LABRUM LESION OF RIGHT SHOULDER, INITIAL ENCOUNTER: ICD-10-CM

## 2024-03-15 ENCOUNTER — TELEPHONE (OUTPATIENT)
Dept: ORTHOPEDICS | Facility: CLINIC | Age: 24
End: 2024-03-15
Payer: MEDICAID

## 2024-03-15 NOTE — TELEPHONE ENCOUNTER
----- Message from Aimee Rios sent at 3/15/2024  9:07 AM CDT -----  .Type:  Needs Medical Advice    Who Called: pt    Would the patient rather a call back or a response via [facility]? Call back  Best Call Back Number: [phone]  Additional Information:     Pt stated she would like a call back about her referral for physical therapy

## 2024-03-28 ENCOUNTER — PATIENT MESSAGE (OUTPATIENT)
Dept: ORTHOPEDICS | Facility: CLINIC | Age: 24
End: 2024-03-28
Payer: MEDICAID

## 2024-04-01 ENCOUNTER — TELEPHONE (OUTPATIENT)
Dept: ORTHOPEDICS | Facility: CLINIC | Age: 24
End: 2024-04-01
Payer: MEDICAID

## 2024-04-01 NOTE — TELEPHONE ENCOUNTER
FAXED Ambulatory referral/consult to Physical/Occupational Therapy [[id number]] (Order [id number])    I also got the confirmation it was faxed

## 2024-05-01 DIAGNOSIS — M25.511 CHRONIC RIGHT SHOULDER PAIN: ICD-10-CM

## 2024-05-01 DIAGNOSIS — G89.29 CHRONIC RIGHT SHOULDER PAIN: ICD-10-CM

## 2024-05-02 ENCOUNTER — OFFICE VISIT (OUTPATIENT)
Dept: ORTHOPEDICS | Facility: CLINIC | Age: 24
End: 2024-05-02
Payer: MEDICAID

## 2024-05-02 VITALS — HEIGHT: 67 IN | WEIGHT: 136.69 LBS | BODY MASS INDEX: 21.45 KG/M2

## 2024-05-02 DIAGNOSIS — M25.311 INSTABILITY OF RIGHT SHOULDER JOINT: Primary | ICD-10-CM

## 2024-05-02 PROCEDURE — 3008F BODY MASS INDEX DOCD: CPT | Mod: CPTII,,, | Performed by: SURGERY

## 2024-05-02 PROCEDURE — 99213 OFFICE O/P EST LOW 20 MIN: CPT | Mod: PBBFAC,PN | Performed by: SURGERY

## 2024-05-02 PROCEDURE — 1159F MED LIST DOCD IN RCRD: CPT | Mod: CPTII,,, | Performed by: SURGERY

## 2024-05-02 PROCEDURE — 99214 OFFICE O/P EST MOD 30 MIN: CPT | Mod: S$PBB,,, | Performed by: SURGERY

## 2024-05-02 PROCEDURE — 99999 PR PBB SHADOW E&M-EST. PATIENT-LVL III: CPT | Mod: PBBFAC,,, | Performed by: SURGERY

## 2024-05-02 RX ORDER — NORETHINDRONE ACETATE AND ETHINYL ESTRADIOL .02; 1 MG/1; MG/1
1 TABLET ORAL DAILY
COMMUNITY

## 2024-05-02 RX ORDER — SODIUM CHLORIDE 9 MG/ML
INJECTION, SOLUTION INTRAVENOUS CONTINUOUS
OUTPATIENT
Start: 2024-05-02

## 2024-05-02 RX ORDER — MUPIROCIN 20 MG/G
OINTMENT TOPICAL
OUTPATIENT
Start: 2024-05-02

## 2024-05-02 NOTE — PROGRESS NOTES
"SUBJECTIVE:    Ms. Thompson is here today for a follow up visit.  She presents in clinic today with concerns of right shoulder instability.  She is a nursing student and a ER tech currently.  She states last year during her 23st birthday she was intoxicated when she fell onto her right shoulder.  She does not recall of any subluxation or dislocation but states immediate pain following the fall.  She has continued had pain ever since.  She said she has difficulty with sleeping along with any overhead motion.  She states any overhead motion or reaching for an object there is a feeling weakness and apprehension within the right shoulder.  She was seen by to [facility] where a right shoulder MRI arthrogram was performed resulting in a labral tear.  She is currently here discuss treatment options.    OBJECTIVE:      Vitals:    05/02/24 0956   Weight: 62 kg (136 lb 11 oz)   Height: 5' 7" (1.702 m)   PainSc:   5       ORTHOPEDIC EXAM:  Right SHOULDER        Skin: No evidence of swelling, redness, scars or visible deformity/atrophy.         Palpation:    No tenderness at the SC or AC joint  No tenderness over the clavicle   Tenderness to palpation over biceps tendon or bicipital groove  No tenderness over subacromial space        ROM:           AROM and PROM full to Forward Flexion, External Rotation, Internal Rotation, and Abduction        Strength Testing:  Forward Flexion           5/5  Abduction                     5/5  Internal Rotation          5/5  External Rotation         5/5        Special Tests:  Empty can test                        -  Full can test                             -  Resisted internal rotation        -  Resisted external rotation       -   Neer's test                               +  Tse'-Gerardo test           -  Drawer test                              +  Load and Shift test                  +  Aprehension Test                    +      Beighton score 5/9           Neurovascular " Exam Bilateral UEs:  Sensation intact to light touch in the distal median, radial, and ulnar nerve distributions bilaterally.  Capillary refill intact <2 seconds in all digits bilaterally       DIAGNOSTIC STUDIES:  MRI Right Shoulder with arthrogram 02/07/2024   I have personally reviewed and interpreted the results with the patient.    Near circumferential labral tearing with sparing of the anterior inferior labrum.  There is tearing of the superior, posterior, and posteroinferior labrum.  Minimal cartilaginous fissuring is noted along the posteroinferior paralabral aspect of the glenoid associated with the tearing.  There is no significant rotator cuff pathology with only minimal tendinosis of the supraspinatus tendon and minimal subdeltoid bursal inflammation.    ASSESSMENT:   1. Right shoulder anterior inferior labrum         PLAN:  There are no diagnoses linked to this encounter.    We had a long discussion of the risks and benefits of different operative and non-operative options. I explained the injury using pictures, models, and the patient's MRI images. I explained the risks of surgery which include but are not limited to continued pain, recurrence/dislocations, deformity, bleeding, infection, damage to surrounding structures, VTEs, swelling of the face and chest, recurrence, arthritis, re-tear, loose bodies and need for further procedures. I explained the risks of co-morbidities which influences the rate of complications. I explained the risks of non-operative management, including continued pain, long term immobilization, rapid arthritis progression, and dislocations. I discussed all of these risks using non-medical terms and confirmed understanding. The patient elected for right shoulder arthroscopy, labral repair, biceps surgery, and any other indicated procedures.   Consents signed.      Randal Bailey MD  [facility]  Orthopedic Surgery      This note was done with voice recognition software.  Please excuse any errors missed in proof reading.

## 2024-06-24 ENCOUNTER — PATIENT MESSAGE (OUTPATIENT)
Dept: ORTHOPEDICS | Facility: CLINIC | Age: 24
End: 2024-06-24
Payer: MEDICAID

## 2024-07-02 ENCOUNTER — ANESTHESIA EVENT (OUTPATIENT)
Dept: SURGERY | Facility: HOSPITAL | Age: 24
End: 2024-07-02
Payer: MEDICAID

## 2024-07-02 NOTE — ANESTHESIA PREPROCEDURE EVALUATION
[facility]  Anesthesia Pre-Operative Evaluation      Patient Name: Teddy Thompson  YOB: 2000  MRN: [id number]    SUBJECTIVE:     Pre-operative Evaluation for Procedure(s) (LRB):  ARTHROSCOPY, SHOULDER, WITH BANKART REPAIR (Right)  ARTHROSCOPY,SHOULDER,WITH BICEPS TENODESIS (Right)     07/02/2024    Teddy Thompson is a 23 y.o. female with a PMHx significant for asthma and R shoulder injury. She now presents for the above procedure(s).    Previous Airway: None documented. Tolerated MAC.    There is no problem list on file for this patient.    Review of patient's allergies indicates:   Allergen Reactions    Cat hair std allergenic ext Itching     Itchy eyes    Topiramate Hives     Current Outpatient Medications   Medication Instructions    dicyclomine (BENTYL) 20 mg, Oral, 4 times daily PRN    doxycycline (VIBRAMYCIN) 100 mg, Oral, Daily, Take with food.    HYDROcodone-acetaminophen (NORCO)  mg per tablet 1 tablet, Oral, Every 6 hours PRN    norethindrone-ethinyl estradiol (MICROGESTIN 1/20) 1-20 mg-mcg per tablet 1 tablet, Oral, Daily    SLYND 4 mg (28) Tab 1 tablet, Oral     Past Surgical History:   Procedure Laterality Date    ANKLE SURGERY Right 2021    BIOPSY OF CERVICAL LYMPH NODE Left 7/5/2023    Procedure: BIOPSY, LYMPH NODE, CERVICAL;  Surgeon: Noe Ames MD;  [facility];  Service: General;  Laterality: Left;    COLONOSCOPY  2021    TONSILLECTOMY       Social History     Substance and Sexual Activity   Drug Use Never     Alcohol Use: Not on file     Tobacco Use: Low Risk  (5/2/2024)    Patient History     Smoking Tobacco Use: Never     Smokeless Tobacco Use: Never     Passive Exposure: Not on file     OBJECTIVE:     Vital Signs Range (Last 24H):       Significant Labs    Heme Profile  Lab Results   Component Value Date    WBC 8.50 06/16/2019    HGB 13.7 06/16/2019    HCT 40.3 06/16/2019     06/16/2019     Coagulation Studies  No  "results found for: "LABPROT", "INR", "APTT"    BMP  Lab Results   Component Value Date     06/16/2019    K 3.8 06/16/2019     06/16/2019    CO2 25 06/16/2019    BUN 13 06/16/2019    CREATININE 0.60 (L) 06/16/2019     Liver Function Tests  Lab Results   Component Value Date    AST 24 06/16/2019    ALT 34 06/16/2019    ALKPHOS 58 06/16/2019    BILITOT 0.5 06/16/2019    PROT 7.4 06/16/2019    ALBUMIN 4.5 06/16/2019     Lipid Profile  Lab Results   Component Value Date    CHOL 134 07/20/2020    HDL 63 07/20/2020    TRIG 106 07/20/2020     Endocrine Profile  Lab Results   Component Value Date    HGBA1C 4.9 05/25/2018     Diagnostic Studies    Cardiac Studies    EKG:   Results for orders placed or performed during the hospital encounter of 12/31/18   EKG 12-lead    Collection Time: 12/31/18 12:02 PM    Narrative    Test Reason : R55,    Vent. Rate : 069 BPM     Atrial Rate : 069 BPM     P-R Int : 120 ms          QRS Dur : 086 ms      QT Int : 406 ms       P-R-T Axes : 029 079 054 degrees     QTc Int : 435 ms    Sinus rhythm . Otherwise normal ECG  No previous ECGs available  Confirmed by Ravinder Chiu MD ([id number]) on 12/31/2018 12:54:01 PM    Referred By: JUSTIN CARRINGTON           Confirmed By:Ravinder Chiu MD     Transthoracic Echo:  No results found for this or any previous visit.    ASSESSMENT/PLAN:     Pre-op Assessment    I have reviewed the Patient Summary Reports.     I have reviewed the Nursing Notes. I have reviewed the NPO Status.   I have reviewed the Medications.     Review of Systems  Anesthesia Hx:  No problems with previous Anesthesia                Social:  Non-Smoker       Cardiovascular:      Denies Hypertension.    Denies CAD.        Denies CHF.                                 Pulmonary:    Asthma                    Renal/:   Denies Chronic Renal Disease.                Hepatic/GI:      Denies GERD.             Neurological:      Headaches                                 Endocrine:  Denies " Diabetes.               Physical Exam  General: Well nourished and Alert    Airway:  Mallampati: I   Neck ROM: Normal ROM    Dental:  Intact        Anesthesia Plan  Type of Anesthesia, risks & benefits discussed:    Anesthesia Type: Gen ETT  Intra-op Monitoring Plan: Standard ASA Monitors  Post Op Pain Control Plan: multimodal analgesia, IV/PO Opioids PRN and peripheral nerve block  Induction:  IV  Airway Plan: Direct  Informed Consent: Informed consent signed with the Patient and all parties understand the risks and agree with anesthesia plan.  All questions answered.   ASA Score: 1  Day of Surgery Review of History & Physical: H&P Update referred to the surgeon/provider.    Ready For Surgery From Anesthesia Perspective.   .

## 2024-07-03 ENCOUNTER — ANESTHESIA (OUTPATIENT)
Dept: SURGERY | Facility: HOSPITAL | Age: 24
End: 2024-07-03
Payer: MEDICAID

## 2024-07-03 ENCOUNTER — HOSPITAL ENCOUNTER (OUTPATIENT)
Facility: HOSPITAL | Age: 24
Discharge: HOME | End: 2024-07-03
Attending: SURGERY | Admitting: SURGERY
Payer: MEDICAID

## 2024-07-03 VITALS
BODY MASS INDEX: 23.23 KG/M2 | RESPIRATION RATE: 17 BRPM | HEIGHT: 67 IN | TEMPERATURE: 98 F | DIASTOLIC BLOOD PRESSURE: 58 MMHG | HEART RATE: 78 BPM | WEIGHT: 148 LBS | OXYGEN SATURATION: 98 % | SYSTOLIC BLOOD PRESSURE: 104 MMHG

## 2024-07-03 DIAGNOSIS — M25.311 INSTABILITY OF RIGHT SHOULDER JOINT: ICD-10-CM

## 2024-07-03 LAB
B-HCG UR QL: NEGATIVE
CTP QC/QA: YES

## 2024-07-03 PROCEDURE — 29806 SHO ARTHRS SRG CAPSULORRAPHY: CPT | Mod: RT,,, | Performed by: SURGERY

## 2024-07-03 PROCEDURE — 25000003 PHARM REV CODE 250: Performed by: ANESTHESIOLOGY

## 2024-07-03 PROCEDURE — 25000003 PHARM REV CODE 250: Performed by: SURGERY

## 2024-07-03 PROCEDURE — 81025 URINE PREGNANCY TEST: CPT | Performed by: SURGERY

## 2024-07-03 PROCEDURE — 64415 NJX AA&/STRD BRCH PLXS IMG: CPT | Mod: 59,RT,, | Performed by: ANESTHESIOLOGY

## 2024-07-03 PROCEDURE — 63600175 PHARM REV CODE 636 W HCPCS

## 2024-07-03 PROCEDURE — 37000009 HC ANESTHESIA EA ADD 15 MINS: Performed by: SURGERY

## 2024-07-03 PROCEDURE — C9290 INJ, BUPIVACAINE LIPOSOME: HCPCS | Performed by: STUDENT IN AN ORGANIZED HEALTH CARE EDUCATION/TRAINING PROGRAM

## 2024-07-03 PROCEDURE — 63600175 PHARM REV CODE 636 W HCPCS: Mod: JZ,JG | Performed by: ANESTHESIOLOGY

## 2024-07-03 PROCEDURE — 37000008 HC ANESTHESIA 1ST 15 MINUTES: Performed by: SURGERY

## 2024-07-03 PROCEDURE — 71000039 HC RECOVERY, EACH ADD'L HOUR: Performed by: SURGERY

## 2024-07-03 PROCEDURE — 27201423 OPTIME MED/SURG SUP & DEVICES STERILE SUPPLY: Performed by: SURGERY

## 2024-07-03 PROCEDURE — 25000003 PHARM REV CODE 250

## 2024-07-03 PROCEDURE — 64415 NJX AA&/STRD BRCH PLXS IMG: CPT | Performed by: STUDENT IN AN ORGANIZED HEALTH CARE EDUCATION/TRAINING PROGRAM

## 2024-07-03 PROCEDURE — 71000033 HC RECOVERY, INTIAL HOUR: Performed by: SURGERY

## 2024-07-03 PROCEDURE — 63600175 PHARM REV CODE 636 W HCPCS: Performed by: STUDENT IN AN ORGANIZED HEALTH CARE EDUCATION/TRAINING PROGRAM

## 2024-07-03 PROCEDURE — 36000711: Performed by: SURGERY

## 2024-07-03 PROCEDURE — 36000710: Performed by: SURGERY

## 2024-07-03 PROCEDURE — 63600175 PHARM REV CODE 636 W HCPCS: Performed by: SURGERY

## 2024-07-03 PROCEDURE — C1713 ANCHOR/SCREW BN/BN,TIS/BN: HCPCS | Performed by: SURGERY

## 2024-07-03 PROCEDURE — 71000015 HC POSTOP RECOV 1ST HR: Performed by: SURGERY

## 2024-07-03 DEVICE — ANCHOR SUT FIBERTAK 1.8 KNTLS: Type: IMPLANTABLE DEVICE | Site: SHOULDER | Status: FUNCTIONAL

## 2024-07-03 RX ORDER — SODIUM CHLORIDE 0.9 % (FLUSH) 0.9 %
10 SYRINGE (ML) INJECTION
Status: DISCONTINUED | OUTPATIENT
Start: 2024-07-03 | End: 2024-07-03 | Stop reason: HOSPADM

## 2024-07-03 RX ORDER — ACETAMINOPHEN 500 MG
1000 TABLET ORAL
Status: COMPLETED | OUTPATIENT
Start: 2024-07-03 | End: 2024-07-03

## 2024-07-03 RX ORDER — ACETAMINOPHEN 500 MG
500 TABLET ORAL EVERY 6 HOURS PRN
Qty: 30 TABLET | Refills: 2 | Status: SHIPPED | OUTPATIENT
Start: 2024-07-03

## 2024-07-03 RX ORDER — MIDAZOLAM HYDROCHLORIDE 1 MG/ML
INJECTION INTRAMUSCULAR; INTRAVENOUS
Status: DISCONTINUED | OUTPATIENT
Start: 2024-07-03 | End: 2024-07-03

## 2024-07-03 RX ORDER — GABAPENTIN 300 MG/1
300 CAPSULE ORAL 3 TIMES DAILY PRN
Qty: 15 CAPSULE | Refills: 0 | Status: SHIPPED | OUTPATIENT
Start: 2024-07-03 | End: 2025-07-03

## 2024-07-03 RX ORDER — FENTANYL CITRATE 50 UG/ML
INJECTION, SOLUTION INTRAMUSCULAR; INTRAVENOUS
Status: DISCONTINUED | OUTPATIENT
Start: 2024-07-03 | End: 2024-07-03

## 2024-07-03 RX ORDER — DEXAMETHASONE SODIUM PHOSPHATE 4 MG/ML
INJECTION, SOLUTION INTRA-ARTICULAR; INTRALESIONAL; INTRAMUSCULAR; INTRAVENOUS; SOFT TISSUE
Status: DISCONTINUED | OUTPATIENT
Start: 2024-07-03 | End: 2024-07-03

## 2024-07-03 RX ORDER — OXYCODONE HYDROCHLORIDE 5 MG/1
5 TABLET ORAL EVERY 4 HOURS PRN
Status: COMPLETED | OUTPATIENT
Start: 2024-07-03 | End: 2024-07-03

## 2024-07-03 RX ORDER — DEXMEDETOMIDINE HYDROCHLORIDE 100 UG/ML
INJECTION, SOLUTION INTRAVENOUS
Status: DISCONTINUED | OUTPATIENT
Start: 2024-07-03 | End: 2024-07-03

## 2024-07-03 RX ORDER — PROPOFOL 10 MG/ML
VIAL (ML) INTRAVENOUS
Status: DISCONTINUED | OUTPATIENT
Start: 2024-07-03 | End: 2024-07-03

## 2024-07-03 RX ORDER — ROCURONIUM BROMIDE 10 MG/ML
INJECTION, SOLUTION INTRAVENOUS
Status: DISCONTINUED | OUTPATIENT
Start: 2024-07-03 | End: 2024-07-03

## 2024-07-03 RX ORDER — ONDANSETRON HYDROCHLORIDE 2 MG/ML
4 INJECTION, SOLUTION INTRAVENOUS DAILY PRN
Status: DISCONTINUED | OUTPATIENT
Start: 2024-07-03 | End: 2024-07-03 | Stop reason: HOSPADM

## 2024-07-03 RX ORDER — LIDOCAINE HYDROCHLORIDE 20 MG/ML
INJECTION, SOLUTION EPIDURAL; INFILTRATION; INTRACAUDAL; PERINEURAL
Status: DISCONTINUED | OUTPATIENT
Start: 2024-07-03 | End: 2024-07-03

## 2024-07-03 RX ORDER — KETAMINE HCL IN 0.9 % NACL 50 MG/5 ML
SYRINGE (ML) INTRAVENOUS
Status: DISCONTINUED | OUTPATIENT
Start: 2024-07-03 | End: 2024-07-03

## 2024-07-03 RX ORDER — OXYCODONE HYDROCHLORIDE 5 MG/1
5 TABLET ORAL EVERY 4 HOURS PRN
Qty: 25 TABLET | Refills: 0 | Status: SHIPPED | OUTPATIENT
Start: 2024-07-03 | End: 2024-07-08 | Stop reason: SDUPTHER

## 2024-07-03 RX ORDER — BUPIVACAINE HYDROCHLORIDE 5 MG/ML
INJECTION, SOLUTION EPIDURAL; INTRACAUDAL
Status: COMPLETED | OUTPATIENT
Start: 2024-07-03 | End: 2024-07-03

## 2024-07-03 RX ORDER — ONDANSETRON HYDROCHLORIDE 2 MG/ML
INJECTION, SOLUTION INTRAVENOUS
Status: DISCONTINUED | OUTPATIENT
Start: 2024-07-03 | End: 2024-07-03

## 2024-07-03 RX ORDER — EPINEPHRINE 1 MG/ML
INJECTION, SOLUTION, CONCENTRATE INTRAVENOUS
Status: DISCONTINUED | OUTPATIENT
Start: 2024-07-03 | End: 2024-07-03 | Stop reason: HOSPADM

## 2024-07-03 RX ORDER — MUPIROCIN 20 MG/G
OINTMENT TOPICAL
Status: DISCONTINUED | OUTPATIENT
Start: 2024-07-03 | End: 2024-07-03 | Stop reason: HOSPADM

## 2024-07-03 RX ORDER — ASPIRIN 81 MG/1
81 TABLET ORAL DAILY
Qty: 30 TABLET | Refills: 0 | Status: SHIPPED | OUTPATIENT
Start: 2024-07-03 | End: 2025-07-03

## 2024-07-03 RX ORDER — IBUPROFEN 600 MG/1
600 TABLET ORAL 3 TIMES DAILY
Qty: 30 TABLET | Refills: 1 | Status: SHIPPED | OUTPATIENT
Start: 2024-07-03

## 2024-07-03 RX ORDER — SODIUM CHLORIDE 9 MG/ML
INJECTION, SOLUTION INTRAVENOUS CONTINUOUS
Status: DISCONTINUED | OUTPATIENT
Start: 2024-07-03 | End: 2024-07-03 | Stop reason: HOSPADM

## 2024-07-03 RX ORDER — SCOLOPAMINE TRANSDERMAL SYSTEM 1 MG/1
1 PATCH, EXTENDED RELEASE TRANSDERMAL
Status: DISCONTINUED | OUTPATIENT
Start: 2024-07-03 | End: 2024-07-03 | Stop reason: HOSPADM

## 2024-07-03 RX ORDER — HYDROMORPHONE HYDROCHLORIDE 2 MG/ML
0.2 INJECTION, SOLUTION INTRAMUSCULAR; INTRAVENOUS; SUBCUTANEOUS EVERY 5 MIN PRN
Status: COMPLETED | OUTPATIENT
Start: 2024-07-03 | End: 2024-07-03

## 2024-07-03 RX ORDER — CEFAZOLIN SODIUM 1 G/3ML
INJECTION, POWDER, FOR SOLUTION INTRAMUSCULAR; INTRAVENOUS
Status: DISCONTINUED | OUTPATIENT
Start: 2024-07-03 | End: 2024-07-03

## 2024-07-03 RX ADMIN — HYDROMORPHONE HYDROCHLORIDE 0.2 MG: 2 INJECTION INTRAMUSCULAR; INTRAVENOUS; SUBCUTANEOUS at 10:07

## 2024-07-03 RX ADMIN — DEXMEDETOMIDINE 12 MCG: 200 INJECTION, SOLUTION INTRAVENOUS at 09:07

## 2024-07-03 RX ADMIN — HYDROMORPHONE HYDROCHLORIDE 0.2 MG: 2 INJECTION INTRAMUSCULAR; INTRAVENOUS; SUBCUTANEOUS at 11:07

## 2024-07-03 RX ADMIN — Medication 10 MG: at 08:07

## 2024-07-03 RX ADMIN — FENTANYL CITRATE 75 MCG: 50 INJECTION INTRAMUSCULAR; INTRAVENOUS at 07:07

## 2024-07-03 RX ADMIN — ROCURONIUM BROMIDE 50 MG: 10 INJECTION, SOLUTION INTRAVENOUS at 07:07

## 2024-07-03 RX ADMIN — BUPIVACAINE HYDROCHLORIDE 10 ML: 5 INJECTION, SOLUTION EPIDURAL; INTRACAUDAL; PERINEURAL at 06:07

## 2024-07-03 RX ADMIN — LIDOCAINE HYDROCHLORIDE 80 MG: 20 INJECTION, SOLUTION EPIDURAL; INFILTRATION; INTRACAUDAL at 07:07

## 2024-07-03 RX ADMIN — CEFAZOLIN 2 G: 330 INJECTION, POWDER, FOR SOLUTION INTRAMUSCULAR; INTRAVENOUS at 07:07

## 2024-07-03 RX ADMIN — SCOPALAMINE 1 PATCH: 1 PATCH, EXTENDED RELEASE TRANSDERMAL at 06:07

## 2024-07-03 RX ADMIN — Medication 10 MG: at 09:07

## 2024-07-03 RX ADMIN — PROPOFOL 180 MG: 10 INJECTION, EMULSION INTRAVENOUS at 07:07

## 2024-07-03 RX ADMIN — Medication 5 MG: at 09:07

## 2024-07-03 RX ADMIN — SUGAMMADEX 200 MG: 100 INJECTION, SOLUTION INTRAVENOUS at 09:07

## 2024-07-03 RX ADMIN — ONDANSETRON 4 MG: 2 INJECTION INTRAMUSCULAR; INTRAVENOUS at 09:07

## 2024-07-03 RX ADMIN — OXYCODONE 5 MG: 5 TABLET ORAL at 11:07

## 2024-07-03 RX ADMIN — Medication 25 MG: at 07:07

## 2024-07-03 RX ADMIN — BUPIVACAINE 10 ML: 13.3 INJECTION, SUSPENSION, LIPOSOMAL INFILTRATION at 06:07

## 2024-07-03 RX ADMIN — PROPOFOL 20 MG: 10 INJECTION, EMULSION INTRAVENOUS at 09:07

## 2024-07-03 RX ADMIN — MIDAZOLAM 2 MG: 1 INJECTION INTRAMUSCULAR; INTRAVENOUS at 06:07

## 2024-07-03 RX ADMIN — FENTANYL CITRATE 25 MCG: 50 INJECTION INTRAMUSCULAR; INTRAVENOUS at 06:07

## 2024-07-03 RX ADMIN — ACETAMINOPHEN 1000 MG: 500 TABLET ORAL at 06:07

## 2024-07-03 RX ADMIN — SODIUM CHLORIDE: 0.9 INJECTION, SOLUTION INTRAVENOUS at 08:07

## 2024-07-03 RX ADMIN — ROCURONIUM BROMIDE 20 MG: 10 INJECTION, SOLUTION INTRAVENOUS at 07:07

## 2024-07-03 RX ADMIN — DEXMEDETOMIDINE 8 MCG: 200 INJECTION, SOLUTION INTRAVENOUS at 09:07

## 2024-07-03 RX ADMIN — PROPOFOL 40 MG: 10 INJECTION, EMULSION INTRAVENOUS at 09:07

## 2024-07-03 RX ADMIN — MUPIROCIN: 20 OINTMENT TOPICAL at 06:07

## 2024-07-03 RX ADMIN — SODIUM CHLORIDE, SODIUM LACTATE, POTASSIUM CHLORIDE, AND CALCIUM CHLORIDE: .6; .31; .03; .02 INJECTION, SOLUTION INTRAVENOUS at 06:07

## 2024-07-03 RX ADMIN — DEXAMETHASONE SODIUM PHOSPHATE 4 MG: 4 INJECTION, SOLUTION INTRA-ARTICULAR; INTRALESIONAL; INTRAMUSCULAR; INTRAVENOUS; SOFT TISSUE at 07:07

## 2024-07-03 NOTE — PLAN OF CARE
Meets criteria for discharge from PACU. COLEMAN betancourt. Pain 6/10    , Released by anesthesia.

## 2024-07-03 NOTE — ANESTHESIA PROCEDURE NOTES
Right Interscalene Peripheral Nerve Block    Patient location during procedure: pre-op   Block not for primary anesthetic.  Reason for block: at surgeon's request and post-op pain management   Post-op Pain Location: Right Shoulder   Start time: 7/3/2024 6:46 AM  Timeout: 7/3/2024 6:45 AM   End time: 7/3/2024 6:53 AM    Staffing  Authorizing Provider: Elvis Arriola MD  Performing Provider: Reggie Lam MD    Staffing  Performed by: Reggie Lam MD  Authorized by: Elvis Arriola MD    Preanesthetic Checklist  Completed: patient identified, IV checked, site marked, risks and benefits discussed, surgical consent, monitors and equipment checked, pre-op evaluation and timeout performed  Peripheral Block  Patient position: sitting  Prep: ChloraPrep  Patient monitoring: heart rate, cardiac monitor, continuous pulse ox, continuous capnometry and frequent blood pressure checks  Block type: interscalene  Laterality: right  Injection technique: single shot  Needle  Needle type: Stimuplex   Needle gauge: 22 G  Needle length: 2 in  Needle localization: anatomical landmarks and ultrasound guidance   -ultrasound image captured on disc.  Assessment  Injection assessment: negative aspiration, negative parasthesia and local visualized surrounding nerve  Paresthesia pain: none  Heart rate change: no  Slow fractionated injection: yes    Medications:    Medications: bupivacaine (pf) (MARCAINE) injection 0.5% - Perineural   10 mL - 7/3/2024 6:53:00 AM    Additional Notes  VSS. Patient tolerated procedure well.

## 2024-07-03 NOTE — OP NOTE
DATE OF PROCEDURE:  07/03/24     PREOPERATIVE DIAGNOSIS:  Right shoulder instability  Right anterior and posterior labral tears.    POSTOPERATIVE DIAGNOSIS: same    PROCEDURE:    1. Anterior labral repair  2. Posterior labral repair  3. Arthroscopic capsullorraphy  3. Glenohumeral joint debridement.  4. Subacromial bursectomy     SURGEON:  Randal Bailey MD     ASSISTANT:  SMA Catherine    Implant Name Type Inv. Item Serial No.  Lot No. LRB No. Used Action   ANCHOR SUT FIBERTAK 1.8 KNTLS - [id number]  ANCHOR SUT FIBERTAK 1.8 KNTLS  [employer] [id number] Right 4 Implanted        PROCEDURE IN DETAIL:  The patient was brought to the Operating Suite after a   continuous scalene nerve block was administered. The patient was placed in lateral position, all bony prominences were very well padded.  The arm was prepped and draped in the standard fashion. A standard posterior inferior mid   acromial portal was established and the intraarticular space was entered. A diagnostic tour of the shoulder was undertaken, noting the following:  Patulous posterior capsule.  Increased laxity anteriorly and posteriorly.  Labral tear about the 6 to 10 o'clock position.  Labral tear about the 3 to 6 o'clock position. There was noted to be a intact rotator cuff. The biceps was noted to be intact as it entered into the bicipital groove, and a biceps tenotomy was not performed.     A plastic cannula was placed in the posterior portal and anterior protal which was made with a spinal needle in inside out fashion. The shaver was used to freshen up the tissue down to a bleeding bony bed from the 6 o'clock position all the way to the 10 o'clock position. I then placed 2 knotless fiber tack anchors shuttled them with a 20° left facing suture lasso and deployed the knotless mechanism. I after tensioning the 6:00 anchor and retensioning the 7:30 anchor I cut the suture on the 7:30 anchor. We then placed a third anchor through an accessory  portal at the 9 o'clock position to secure the bumper and thus the posterior labral repair and capsulorraphy was completed.    The anterior labrum was then addressed. We performed the same procedure freshening the labral hernando and glenoid with a rasp and elevator as well as shaver. I then placed a knotless fiber tack anchors shuttled them with a 90 degree suture lasso. We deployed the knotless mechanism. I tensioned the anchor and the labrum appeared improved.    The labral repairs, capsulloraphy and glenohumeral joint debridement were thusly completed.     We then entered the subacromial space. A small lateral portal was made using a spinal needle and switching stick. There was extensive inflamed bursal tissue which appeared inflamed. This was removed with a shaver and electrocautery.    The procedure complete, the arthroscopic instrumentation was removed.  The incisions were closed with monocryl suture and steri strips.  The patient was placed in a sterile compressive dressing, sling and swathe, awakened, and transferred to the Recovery Room in good condition.    POSTOPERATIVE COURSE:  We will discharge the patient home from the hospital today  Nonweight bearing to the operative extremity  Sling at all times until follow up  Oxycodone PRN q6h for pain  Motrin Q6H standing, tylenol q6h standing  Gabapentin PRN for nerve pain * 3 days

## 2024-07-03 NOTE — PLAN OF CARE
Discharge criteria met. Voicing desire to go home. Discharge instructions given to patient and significant other. Verbalized understanding. All lines removed. Vital signs stable. Pain, nausea, vomiting controlled. Patient discharged per wheelchair in care of significant other.

## 2024-07-03 NOTE — H&P
"Interval H/P: no updates to HPI as per below.  Plan for procedure - arthroscopic bankhardt repair, capsulloraphy, possible biceps surgery, labral debridement, bursectomy and any other indicated procedures.         SUBJECTIVE:     Ms. Thompson is here today for a follow up visit.  She presents in clinic today with concerns of right shoulder instability.  She is a nursing student and a ER tech currently.  She states last year during her 23st birthday she was intoxicated when she fell onto her right shoulder.  She does not recall of any subluxation or dislocation but states immediate pain following the fall.  She has continued had pain ever since.  She said she has difficulty with sleeping along with any overhead motion.  She states any overhead motion or reaching for an object there is a feeling weakness and apprehension within the right shoulder.  She was seen by to be [facility] where a right shoulder MRI arthrogram was performed resulting in a labral tear.  She is currently here discuss treatment options.     OBJECTIVE:           Vitals:     05/02/24 0956   Weight: 62 kg (136 lb 11 oz)   Height: 5' 7" (1.702 m)   PainSc:   5         ORTHOPEDIC EXAM:  Right SHOULDER        Skin: No evidence of swelling, redness, scars or visible deformity/atrophy.         Palpation:    No tenderness at the SC or AC joint  No tenderness over the clavicle   Tenderness to palpation over biceps tendon or bicipital groove  No tenderness over subacromial space        ROM:           AROM and PROM full to Forward Flexion, External Rotation, Internal Rotation, and Abduction        Strength Testing:  Forward Flexion           5/5  Abduction                     5/5  Internal Rotation          5/5  External Rotation         5/5        Special Tests:  Empty can test                        -  Full can test                             -  Resisted internal rotation        -  Resisted external rotation       -   Neer's test               "                 +  Tse'-Geradro test           -  Drawer test                              +  Load and Shift test                  +  Aprehension Test                    +        Beighton score 5/9            Neurovascular Exam Bilateral UEs:  Sensation intact to light touch in the distal median, radial, and ulnar nerve distributions bilaterally.  Capillary refill intact <2 seconds in all digits bilaterally        DIAGNOSTIC STUDIES:  MRI Right Shoulder with arthrogram 02/07/2024   I have personally reviewed and interpreted the results with the patient.     Near circumferential labral tearing with sparing of the anterior inferior labrum.  There is tearing of the superior, posterior, and posteroinferior labrum.  Minimal cartilaginous fissuring is noted along the posteroinferior paralabral aspect of the glenoid associated with the tearing.  There is no significant rotator cuff pathology with only minimal tendinosis of the supraspinatus tendon and minimal subdeltoid bursal inflammation.     ASSESSMENT:   1. Right shoulder anterior inferior labrum            PLAN:  There are no diagnoses linked to this encounter.     We had a long discussion of the risks and benefits of different operative and non-operative options. I explained the injury using pictures, models, and the patient's MRI images. I explained the risks of surgery which include but are not limited to continued pain, recurrence/dislocations, stiffness, deformity, bleeding, infection, damage to surrounding structures, VTEs, swelling of the face and chest, recurrence, arthritis, re-tear, loose bodies and need for further procedures. I explained the risks of co-morbidities which influences the rate of complications. I explained the risks of non-operative management, including continued pain, long term immobilization, rapid arthritis progression, and dislocations. I discussed all of these risks using non-medical terms and confirmed understanding. The patient  elected for right shoulder arthroscopy, labral repair, biceps surgery, and any other indicated procedures.   Consents signed.        Randal Bailey MD  [facility]  Orthopedic Surgery

## 2024-07-03 NOTE — ANESTHESIA PROCEDURE NOTES
Intubation    Date/Time: 7/3/2024 7:08 AM    Performed by: Maribeth Sr MD  Authorized by: Elvis Arriola MD    Intubation:     Induction:  Intravenous    Intubated:  Postinduction    Mask Ventilation:  Easy with oral airway    Attempts:  1    Attempted By:  Resident anesthesiologist    Method of Intubation:  Direct    Blade:  Graciela 3    Laryngeal View Grade: Grade I - full view of cords      Difficult Airway Encountered?: No      Complications:  None    Airway Device:  Oral endotracheal tube    Airway Device Size:  7.0    Inflation Amount (mL):  7    Tube secured:  21    Secured at:  The lips    Placement Verified By:  Capnometry    Complicating Factors:  None    Findings Post-Intubation:  BS equal bilateral

## 2024-07-03 NOTE — DISCHARGE SUMMARY
[facility] - Surgery (Hospital)  Discharge Note  Short Stay    Procedure(s) (LRB):  ARTHROSCOPY, SHOULDER, WITH BANKART REPAIR (Right)  DEBRIDEMENT, SHOULDER, ARTHROSCOPIC (Right)  CAPSULORRHAPHY (Right)      OUTCOME: Patient tolerated treatment/procedure well without complication and is now ready for discharge.    DISPOSITION: Home or Self Care    FINAL DIAGNOSIS:  <principal problem not specified>    FOLLOWUP: In clinic    DISCHARGE INSTRUCTIONS:    Discharge Procedure Orders   Diet Adult Regular      Remove dressing in 72 hours        TIME SPENT ON DISCHARGE: 10 minutes

## 2024-07-03 NOTE — TRANSFER OF CARE
"Anesthesia Transfer of Care Note    Patient: Teddy Thompson    Procedure(s) Performed: Procedure(s) (LRB):  ARTHROSCOPY, SHOULDER, WITH BANKART REPAIR (Right)  DEBRIDEMENT, SHOULDER, ARTHROSCOPIC (Right)  CAPSULORRHAPHY (Right)    Patient location: PACU    Transport from OR: Transported from OR on 6-10 L/min O2 by face mask with adequate spontaneous ventilation    Post pain: adequate analgesia    Post assessment: no apparent anesthetic complications    Post vital signs: stable    Level of consciousness: awake    Nausea/Vomiting: no nausea/vomiting    Complications: none    Transfer of care protocol was followed      Last vitals: Visit Vitals  /69 (BP Location: Right arm, Patient Position: Lying)   Pulse 70   Temp 37.2 °C (98.9 °F) (Tympanic)   Resp 16   Ht 5' 7" (1.702 m)   Wt 67.1 kg (148 lb)   SpO2 100%   Breastfeeding No   BMI 23.18 kg/m²     "

## 2024-07-03 NOTE — DISCHARGE INSTRUCTIONS
POSTOPERATIVE COURSE:  We will discharge the patient home from the hospital today  Nonweight bearing to the operative extremity  Sling at all times until follow up  Oxycodone PRN q6h for pain  Motrin Q6H standing, tylenol q6h standing  Gabapentin PRN for nerve pain * 3 days     ANESTHESIA  -For the first 24 hours after surgery:  Do not drive, use heavy equipment, make important decisions, or drink alcohol  -It is normal to feel sleepy for several hours.  Rest until you are more awake.  -Have someone stay with you, if needed.  They can watch for problems and help keep you safe.  -Some possible post anesthesia side effects include: nausea and vomiting, sore throat and hoarseness, sleepiness, and dizziness.    PAIN  -If you have pain after surgery, pain medicine will help you feel better.  Take it as directed, before pain becomes severe.  Most pain relievers taken by mouth need at least 20-30 minutes to start working.  -Do not drive or drink alcohol while taking pain medicine.  -Pain medication can upset your stomach.  Taking them with a little food may help.  -Other ways to help control pain: elevation, ice, and relaxation  -Call your surgeon if still having unmanageable pain an hour after taking pain medicine.  -Pain medicine can cause constipation.  Taking an over-the counter stool softener while on prescription pain medicine and drinking plenty of fluids can prevent this side effect.  -Call your surgeon if you have severe side effects like: breathing problems, trouble waking up, dizziness, confusion, or severe constipation.    NAUSEA  -Some people have nausea after surgery.  This is often because of anesthesia, pain, pain medicine, or the stress of surgery.  -Do not push yourself to eat.  Start off with clear liquids and soup.  Slowly move to solid foods.  Don't eat fatty, rich, spicy foods at first.  Eat smaller amounts.  -If you develop persistent nausea and vomiting please notify your surgeon  immediately.    BLEEDING  -Different types of surgery require different types of care and dressing changes.  It is important to follow all instructions and advice from your surgeon.  Change dressing as directed.  Call your surgeon for any concerns regarding postop bleeding.    SIGNS OF INFECTION  -Signs of infection include: fever, swelling, drainage, and redness  -Notify your surgeon if you have a fever of 100.4 F (38.0 C) or higher.  -Notify your surgeon if you notice redness, swelling, increased pain, pus, or a foul smell at the incision site.

## 2024-07-04 ENCOUNTER — PATIENT MESSAGE (OUTPATIENT)
Dept: ORTHOPEDICS | Facility: CLINIC | Age: 24
End: 2024-07-04
Payer: MEDICAID

## 2024-07-05 NOTE — ANESTHESIA POSTPROCEDURE EVALUATION
Anesthesia Post Evaluation    Patient: Teddy Thompson    Procedure(s) Performed: Procedure(s) (LRB):  ARTHROSCOPY, SHOULDER, WITH BANKART REPAIR (Right)  DEBRIDEMENT, SHOULDER, ARTHROSCOPIC (Right)  CAPSULORRHAPHY (Right)    Final Anesthesia Type: general      Patient location during evaluation: PACU  Patient participation: Yes- Able to Participate  Level of consciousness: awake and alert  Post-procedure vital signs: reviewed and stable  Pain management: adequate  Airway patency: patent    PONV status at discharge: No PONV  Anesthetic complications: no      Cardiovascular status: blood pressure returned to baseline and hemodynamically stable  Respiratory status: unassisted  Hydration status: euvolemic  Follow-up not needed.              Vitals Value Taken Time   /58 07/03/24 1215   Temp 36.8 °C (98.2 °F) 07/03/24 1215   Pulse 78 07/03/24 1215   Resp 17 07/03/24 1215   SpO2 98 % 07/03/24 1215         Event Time   Out of Recovery 11:19:36         Pain/Santos Score: No data recorded

## 2024-07-08 DIAGNOSIS — S43.431A SUPERIOR GLENOID LABRUM LESION OF RIGHT SHOULDER, INITIAL ENCOUNTER: Primary | ICD-10-CM

## 2024-07-10 RX ORDER — OXYCODONE HYDROCHLORIDE 5 MG/1
5 TABLET ORAL EVERY 4 HOURS PRN
Qty: 25 TABLET | Refills: 0 | Status: SHIPPED | OUTPATIENT
Start: 2024-07-10

## 2024-07-18 ENCOUNTER — OFFICE VISIT (OUTPATIENT)
Dept: ORTHOPEDICS | Facility: CLINIC | Age: 24
End: 2024-07-18
Payer: MEDICAID

## 2024-07-18 VITALS — HEIGHT: 67 IN | BODY MASS INDEX: 23.18 KG/M2 | WEIGHT: 147.69 LBS

## 2024-07-18 DIAGNOSIS — M25.311 INSTABILITY OF RIGHT SHOULDER JOINT: Primary | ICD-10-CM

## 2024-07-18 DIAGNOSIS — M25.511 CHRONIC RIGHT SHOULDER PAIN: ICD-10-CM

## 2024-07-18 DIAGNOSIS — G89.29 CHRONIC RIGHT SHOULDER PAIN: ICD-10-CM

## 2024-07-18 PROCEDURE — 1159F MED LIST DOCD IN RCRD: CPT | Mod: CPTII,,, | Performed by: SURGERY

## 2024-07-18 PROCEDURE — 99024 POSTOP FOLLOW-UP VISIT: CPT | Mod: ,,, | Performed by: SURGERY

## 2024-07-18 PROCEDURE — 99213 OFFICE O/P EST LOW 20 MIN: CPT | Mod: PBBFAC,PN | Performed by: SURGERY

## 2024-07-18 PROCEDURE — 99999 PR PBB SHADOW E&M-EST. PATIENT-LVL III: CPT | Mod: PBBFAC,,, | Performed by: SURGERY

## 2024-07-18 NOTE — PROGRESS NOTES
SUBJECTIVE:    Teddy Thompson is here today for a follow up visit.  Two weeks status post right shoulder arthroscopy with Bankart repair, debridement.  Date of surgery 07/03/2024  Doing well.  She has not started physical therapy yet.  She has been compliant with postop sling She is stopped all postop pain medication Per patient report: no fevers, chills, shortness of breath, chest pain, or increased extremity pain.  Presents for further follow up.      OBJECTIVE:    There were no vitals filed for this visit.      Extremity Exam  Incisions clean dry and intact, no erythema, drainage, exudate, minimal ecchymosis.  Presents in sling with abduction pillow.  Full range of motion of right elbow, full range of motion of right wrist.   Neurovascularly intact.     DIAGNOSTIC STUDIES:  No new imaging today    ASSESSMENT:   1. Status post procedure above      PLAN:     Continue sling with abduction pillow for 2 more weeks, may come out of it at the 4 week postop tamela.  She is to begin physical therapy, physical therapy protocol and referral was provided to patient today.  She will follow up in clinic in 4 weeks.  All questions were answered at today's visit.     Randal Bailey MD  [facility]  Orthopedic Surgery      This note was done with voice recognition software. Please excuse any errors missed in proof reading.

## 2024-08-15 ENCOUNTER — OFFICE VISIT (OUTPATIENT)
Dept: ORTHOPEDICS | Facility: CLINIC | Age: 24
End: 2024-08-15
Payer: MEDICAID

## 2024-08-15 VITALS — HEIGHT: 67 IN | WEIGHT: 150.56 LBS | BODY MASS INDEX: 23.63 KG/M2

## 2024-08-15 DIAGNOSIS — M25.311 INSTABILITY OF RIGHT SHOULDER JOINT: Primary | ICD-10-CM

## 2024-08-15 PROCEDURE — 99213 OFFICE O/P EST LOW 20 MIN: CPT | Mod: PBBFAC,PN | Performed by: SURGERY

## 2024-08-15 PROCEDURE — 99999 PR PBB SHADOW E&M-EST. PATIENT-LVL III: CPT | Mod: PBBFAC,,, | Performed by: SURGERY

## 2024-08-15 NOTE — PROGRESS NOTES
SUBJECTIVE:    Teddy Thompson is here today for a follow up visit.  6 weeks status post right shoulder arthroscopy with Bankart repair, debridement.  Date of surgery 07/03/2024  Doing well.  She has started physical therapy.  She has has discontinued the sling.  Still notes some stiffness.  No pain at all, sometimes experiences mild discomfort while trying to sleep.  Presents for further follow up.      OBJECTIVE:    There were no vitals filed for this visit.      Extremity Exam  Incisions clean dry and intact, no erythema, drainage, exudate, minimal ecchymosis.  Abducts to 90, however some of this is coming from the trapezius, externally rotates just to neutral.  Full range of motion of right elbow, full range of motion of right wrist.   Neurovascularly intact.     DIAGNOSTIC STUDIES:  No new imaging today    ASSESSMENT:   1. Status post procedure above      PLAN:   We discussed the importance of physical therapy and her postoperative course.  I think she will progress extremely well over the next 6 weeks.  Work note given with parameters for return to work which I anticipate will be at the three-month tamela.  Follow up in 5 weeks.  All questions answered.  Randal Bailey MD  [facility]  Orthopedic Surgery      This note was done with voice recognition software. Please excuse any errors missed in proof reading.

## 2024-09-19 ENCOUNTER — OFFICE VISIT (OUTPATIENT)
Dept: ORTHOPEDICS | Facility: CLINIC | Age: 24
End: 2024-09-19
Payer: MEDICAID

## 2024-09-19 VITALS — BODY MASS INDEX: 23.58 KG/M2 | HEIGHT: 67 IN

## 2024-09-19 DIAGNOSIS — G89.29 CHRONIC RIGHT SHOULDER PAIN: Primary | ICD-10-CM

## 2024-09-19 DIAGNOSIS — M25.511 CHRONIC RIGHT SHOULDER PAIN: Primary | ICD-10-CM

## 2024-09-19 DIAGNOSIS — M25.311 INSTABILITY OF RIGHT SHOULDER JOINT: ICD-10-CM

## 2024-09-19 PROCEDURE — 1159F MED LIST DOCD IN RCRD: CPT | Mod: CPTII,,, | Performed by: SURGERY

## 2024-09-19 PROCEDURE — 99212 OFFICE O/P EST SF 10 MIN: CPT | Mod: PBBFAC,PN | Performed by: SURGERY

## 2024-09-19 PROCEDURE — 99024 POSTOP FOLLOW-UP VISIT: CPT | Mod: ,,, | Performed by: SURGERY

## 2024-09-19 PROCEDURE — 99999 PR PBB SHADOW E&M-EST. PATIENT-LVL II: CPT | Mod: PBBFAC,,, | Performed by: SURGERY

## 2024-09-19 NOTE — PROGRESS NOTES
SUBJECTIVE:    Teddy Thompson is here today for a follow up visit.  Eleven weeks status post right shoulder arthroscopy with Bankart repair, debridement.  Date of surgery 07/03/2024  Doing well.  She has started physical therapy.  She has has discontinued the sling.  She still has some residual stiffness and lacks complete motion on that side.    OBJECTIVE:    There were no vitals filed for this visit.      Extremity Exam  Incisions clean dry and intact, no erythema, drainage, exudate, minimal ecchymosis.  Abducts to 130, however some of this is coming from the trapezius, externally rotates just to neutral.  Full range of motion of right elbow, full range of motion of right wrist.   Neurovascularly intact.     DIAGNOSTIC STUDIES:  No new imaging today    ASSESSMENT:   1. Status post procedure above      PLAN:  She has clearance to return to work.  We discussed guided stretching with therapy.  At this point she requires aggressive range of motion treatment.  She has no restrictions at this time.  Follow up with me in 3 months versus as needed.      Randal Bailey MD  [facility]  Orthopedic Surgery      This note was done with voice recognition software. Please excuse any errors missed in proof reading.

## 2024-11-25 ENCOUNTER — PATIENT MESSAGE (OUTPATIENT)
Dept: ORTHOPEDICS | Facility: CLINIC | Age: 24
End: 2024-11-25
Payer: MEDICAID

## 2024-12-05 ENCOUNTER — TELEPHONE (OUTPATIENT)
Dept: ORTHOPEDICS | Facility: CLINIC | Age: 24
End: 2024-12-05
Payer: MEDICAID

## (undated) DEVICE — SPONGE COTTON TRAY 4X4IN

## (undated) DEVICE — COVER MAYO STND XL 30X57IN

## (undated) DEVICE — PAD ABDOMINAL 5X9 STERILE

## (undated) DEVICE — COVER LIGHT HANDLE 80/CA

## (undated) DEVICE — SOL HYDROGEN PEROXIDE 4OZ 3%

## (undated) DEVICE — GOWN ECLIPSE REINF LV4 TWL 2XL

## (undated) DEVICE — SOL IRR NACL .9% 3000ML

## (undated) DEVICE — KIT FIBERTAK PERC INSRT 1.8MM

## (undated) DEVICE — DRAPE INCISE IOBAN 2 23X23IN

## (undated) DEVICE — SUT MONOCRYL 2-0 UND MONO C

## (undated) DEVICE — GOWN POLY REINF BRTH SLV XL

## (undated) DEVICE — CONNECTOR TUBING STR 5 IN 1

## (undated) DEVICE — INSTRAMOD SHOULDER TRACTION

## (undated) DEVICE — TUBE SET INFLOW/OUTFLOW

## (undated) DEVICE — TAPE ADH MEDIPORE 4 X 10YDS

## (undated) DEVICE — BNDG COFLEX FOAM LF2 ST 4X5YD

## (undated) DEVICE — DRAPE TOP 53X102IN

## (undated) DEVICE — GLOVE SENSICARE PI GRN 8.5

## (undated) DEVICE — PROBE ARTHO ENERGY 90 DEG

## (undated) DEVICE — DRAPE INVISISHIELD U 48X52IN

## (undated) DEVICE — CANNULA TWIST IN 7MM X 7CM

## (undated) DEVICE — DRAPE THREE-QTR REINF 53X77IN

## (undated) DEVICE — Device

## (undated) DEVICE — TUBING SUC UNIV W/CONN 12FT

## (undated) DEVICE — BLADE SURG #15 CARBON STEEL

## (undated) DEVICE — LASSO SUTURE QUICKPASS 90DEG

## (undated) DEVICE — GAUZE SPONGE 4'X4 12 PLY

## (undated) DEVICE — MANIFOLD 4 PORT

## (undated) DEVICE — DRESSING XEROFORM 1X8IN

## (undated) DEVICE — ELECTRODE REM PLYHSV RETURN 9

## (undated) DEVICE — BLADE VORTEX SHAVER 4.5MMX13CM

## (undated) DEVICE — GLOVE SENSICARE PI SURG 8

## (undated) DEVICE — PASSER TIGHT CRV QUICKPASS L

## (undated) DEVICE — PAD PREP CUFFED NS 24X48IN

## (undated) DEVICE — SUPPORT SLING SHOT II MEDIUM

## (undated) DEVICE — GLOVE SENSICARE PI GRN 7.5

## (undated) DEVICE — PACK SHOULDER DRAPE POUCH

## (undated) DEVICE — TOWEL OR XRAY BLUE 17X26IN

## (undated) DEVICE — CLOSURE SKIN STERI STRIP 1/2X4

## (undated) DEVICE — SUPPORT ULNA NERVE PROTECTOR

## (undated) DEVICE — ALCOHOL ISOPROPYL 4OZ

## (undated) DEVICE — GLOVE SENSICARE PI SURG 7.5

## (undated) DEVICE — NDL SPINAL 18GX3.5 SPINOCAN

## (undated) DEVICE — PAD ABDOMINAL STERILE 8X10IN

## (undated) DEVICE — DRAPE STERI-DRAPE 1000 17X11IN

## (undated) DEVICE — MAT SUCTION PUDDLEVAC ORANGE

## (undated) DEVICE — PACK SURGERY START

## (undated) DEVICE — APPLICATOR CHLORAPREP ORN 26ML

## (undated) DEVICE — DRAPE U SPLIT SHEET 54X76IN

## (undated) DEVICE — KIT FIBERTAK CURVED SPEAR 1.8M

## (undated) DEVICE — ADHESIVE DERMABOND ADVANCED

## (undated) DEVICE — PACK ECLIPSE BASIC III SURG